# Patient Record
Sex: MALE | Race: WHITE | NOT HISPANIC OR LATINO | Employment: FULL TIME | URBAN - METROPOLITAN AREA
[De-identification: names, ages, dates, MRNs, and addresses within clinical notes are randomized per-mention and may not be internally consistent; named-entity substitution may affect disease eponyms.]

---

## 2018-06-29 ENCOUNTER — HOSPITAL ENCOUNTER (EMERGENCY)
Facility: HOSPITAL | Age: 63
Discharge: ANOTHER HOSPITAL | End: 2018-06-29
Attending: FAMILY MEDICINE | Admitting: FAMILY MEDICINE
Payer: COMMERCIAL

## 2018-06-29 ENCOUNTER — HOSPITAL ENCOUNTER (INPATIENT)
Facility: HOSPITAL | Age: 63
LOS: 1 days | Discharge: HOME | DRG: 247 | End: 2018-06-30
Attending: EMERGENCY MEDICINE | Admitting: INTERNAL MEDICINE
Payer: COMMERCIAL

## 2018-06-29 ENCOUNTER — APPOINTMENT (OUTPATIENT)
Dept: CT IMAGING | Facility: HOSPITAL | Age: 63
End: 2018-06-29
Attending: FAMILY MEDICINE
Payer: COMMERCIAL

## 2018-06-29 VITALS
SYSTOLIC BLOOD PRESSURE: 131 MMHG | WEIGHT: 205.03 LBS | RESPIRATION RATE: 22 BRPM | HEART RATE: 64 BPM | TEMPERATURE: 97.5 F | DIASTOLIC BLOOD PRESSURE: 79 MMHG | OXYGEN SATURATION: 99 %

## 2018-06-29 DIAGNOSIS — I21.3 ST ELEVATION MYOCARDIAL INFARCTION (STEMI), UNSPECIFIED ARTERY (CMS/HCC): Primary | ICD-10-CM

## 2018-06-29 DIAGNOSIS — I25.9 CHEST PAIN DUE TO MYOCARDIAL ISCHEMIA, UNSPECIFIED ISCHEMIC CHEST PAIN TYPE: ICD-10-CM

## 2018-06-29 DIAGNOSIS — I21.3 ST ELEVATION MYOCARDIAL INFARCTION (STEMI), UNSPECIFIED ARTERY (CMS/HCC): ICD-10-CM

## 2018-06-29 DIAGNOSIS — I21.09 ST ELEVATION MYOCARDIAL INFARCTION (STEMI) INVOLVING OTHER CORONARY ARTERY OF ANTERIOR WALL (CMS/HCC): ICD-10-CM

## 2018-06-29 DIAGNOSIS — R07.9 CHEST PAIN: Primary | ICD-10-CM

## 2018-06-29 LAB
ALBUMIN SERPL-MCNC: 3.6 G/DL (ref 3.5–5.3)
ALP SERPL-CCNC: 72 U/L (ref 45–115)
ALT SERPL-CCNC: 14 U/L (ref 0–52)
ANION GAP SERPL CALC-SCNC: 12 MMOL/L (ref 3–11)
APTT PPP: 31 SECONDS (ref 24–37)
AST SERPL-CCNC: 11 U/L (ref 0–39)
BASOPHILS NFR BLD AUTO: 1 % (ref 0–2)
BILIRUB SERPL-MCNC: 0.48 MG/DL (ref 0–1.4)
BNP SERPL-MCNC: 36 PG/ML (ref 0–99)
BUN SERPL-MCNC: 16 MG/DL (ref 7–25)
CALCIUM ALBUM COR SERPL-MCNC: 9.4 MG/DL (ref 8.5–10.1)
CALCIUM SERPL-MCNC: 9.1 MG/DL (ref 8.6–10.3)
CHLORIDE SERPL-SCNC: 105 MMOL/L (ref 98–107)
CHOLEST SERPL-MCNC: 241 MG/DL (ref 0–199)
CO2 SERPL-SCNC: 20 MMOL/L (ref 21–32)
CREAT SERPL-MCNC: 0.9 MG/DL (ref 0.8–1.3)
D DIMER PPP FEU-MCNC: 0.37 UG/ML FEU (ref 0–0.5)
EOSINOPHIL NFR BLD AUTO: 2 % (ref 0–3)
ERYTHROCYTE [DISTWIDTH] IN BLOOD BY AUTOMATED COUNT: 14.2 % (ref 11.5–15)
GFR SERPL CREATININE-BSD FRML MDRD: 85 ML/MIN/1.73M*2
GLUCOSE SERPL-MCNC: 141 MG/DL (ref 70–105)
HCT VFR BLD AUTO: 44.3 % (ref 38–50)
HDLC SERPL-MCNC: 37 MG/DL
HGB BLD-MCNC: 15.5 G/DL (ref 13.2–17.2)
INR PPP: 0.98
LDLC SERPL CALC-MCNC: 169 MG/DL (ref 0–99)
LYMPHOCYTES NFR BLD AUTO: 37 % (ref 15–47)
MAGNESIUM SERPL-MCNC: 1.9 MG/DL (ref 1.8–2.4)
MCH RBC QN AUTO: 32.3 PG (ref 29–34)
MCHC RBC AUTO-ENTMCNC: 35 G/DL (ref 32–36)
MCV RBC AUTO: 92 FL (ref 82–97)
MONOCYTES NFR BLD AUTO: 8 % (ref 5–13)
NEUTROPHILS NFR BLD AUTO: 52 % (ref 46–70)
PLATELET # BLD AUTO: 282 10*3/UL (ref 130–350)
PMV BLD AUTO: 6.8 FL (ref 6.9–10.8)
POTASSIUM SERPL-SCNC: 3.6 MMOL/L (ref 3.5–5.1)
PROT SERPL-MCNC: 5.9 G/DL (ref 6–8.3)
PROTHROMBIN TIME: 13.1 SECONDS (ref 12.3–14.8)
RBC # BLD AUTO: 4.81 10*6/ΜL (ref 4.1–5.8)
SODIUM SERPL-SCNC: 137 MMOL/L (ref 135–145)
TRIGL SERPL-MCNC: 177 MG/DL
TROPONIN I SERPL-MCNC: 4.96 NG/ML
TROPONIN I SERPL-MCNC: <0.03 NG/ML
WBC # BLD AUTO: 5.5 10*3/UL (ref 3.7–9.6)

## 2018-06-29 PROCEDURE — C1874 STENT, COATED/COV W/DEL SYS: HCPCS | Performed by: INTERNAL MEDICINE

## 2018-06-29 PROCEDURE — 6360000200 HC RX 636 W HCPCS (ALT 250 FOR IP): Performed by: FAMILY MEDICINE

## 2018-06-29 PROCEDURE — 96365 THER/PROPH/DIAG IV INF INIT: CPT

## 2018-06-29 PROCEDURE — C1887 CATHETER, GUIDING: HCPCS | Performed by: INTERNAL MEDICINE

## 2018-06-29 PROCEDURE — 92941 PRQ TRLML REVSC TOT OCCL AMI: CPT | Mod: LD | Performed by: INTERNAL MEDICINE

## 2018-06-29 PROCEDURE — 93010 ELECTROCARDIOGRAM REPORT: CPT | Mod: NC | Performed by: FAMILY MEDICINE

## 2018-06-29 PROCEDURE — C1725 CATH, TRANSLUMIN NON-LASER: HCPCS | Performed by: INTERNAL MEDICINE

## 2018-06-29 PROCEDURE — 93005 ELECTROCARDIOGRAM TRACING: CPT | Performed by: EMERGENCY MEDICINE

## 2018-06-29 PROCEDURE — 6360000200 HC RX 636 W HCPCS (ALT 250 FOR IP): Performed by: INTERNAL MEDICINE

## 2018-06-29 PROCEDURE — 80061 LIPID PANEL: CPT | Performed by: FAMILY MEDICINE

## 2018-06-29 PROCEDURE — 2580000300 HC RX 258

## 2018-06-29 PROCEDURE — 36415 COLL VENOUS BLD VENIPUNCTURE: CPT | Performed by: EMERGENCY MEDICINE

## 2018-06-29 PROCEDURE — 99152 MOD SED SAME PHYS/QHP 5/>YRS: CPT | Performed by: INTERNAL MEDICINE

## 2018-06-29 PROCEDURE — 99205 OFFICE O/P NEW HI 60 MIN: CPT | Mod: 25 | Performed by: INTERNAL MEDICINE

## 2018-06-29 PROCEDURE — 2550000100 HC RX 255: Mod: JW | Performed by: INTERNAL MEDICINE

## 2018-06-29 PROCEDURE — 2580000300 HC RX 258: Performed by: INTERNAL MEDICINE

## 2018-06-29 PROCEDURE — (BLANK) HC CATH LAB LEVEL 2 EACH ADDITIONAL MIN: Performed by: INTERNAL MEDICINE

## 2018-06-29 PROCEDURE — C1769 GUIDE WIRE: HCPCS | Performed by: INTERNAL MEDICINE

## 2018-06-29 PROCEDURE — 85730 THROMBOPLASTIN TIME PARTIAL: CPT | Performed by: FAMILY MEDICINE

## 2018-06-29 PROCEDURE — 2550000100 HC RX 255: Performed by: FAMILY MEDICINE

## 2018-06-29 PROCEDURE — 6360000200 HC RX 636 W HCPCS (ALT 250 FOR IP)

## 2018-06-29 PROCEDURE — 92928 PRQ TCAT PLMT NTRAC ST 1 LES: CPT | Mod: RC | Performed by: INTERNAL MEDICINE

## 2018-06-29 PROCEDURE — C1894 INTRO/SHEATH, NON-LASER: HCPCS | Performed by: INTERNAL MEDICINE

## 2018-06-29 PROCEDURE — 80053 COMPREHEN METABOLIC PANEL: CPT | Performed by: FAMILY MEDICINE

## 2018-06-29 PROCEDURE — 85025 COMPLETE CBC W/AUTO DIFF WBC: CPT | Performed by: FAMILY MEDICINE

## 2018-06-29 PROCEDURE — 71275 CT ANGIOGRAPHY CHEST: CPT

## 2018-06-29 PROCEDURE — 99284 EMERGENCY DEPT VISIT MOD MDM: CPT | Performed by: EMERGENCY MEDICINE

## 2018-06-29 PROCEDURE — 96372 THER/PROPH/DIAG INJ SC/IM: CPT

## 2018-06-29 PROCEDURE — 85379 FIBRIN DEGRADATION QUANT: CPT | Performed by: FAMILY MEDICINE

## 2018-06-29 PROCEDURE — 6370000100 HC RX 637 (ALT 250 FOR IP): Performed by: FAMILY MEDICINE

## 2018-06-29 PROCEDURE — 6370000100 HC RX 637 (ALT 250 FOR IP)

## 2018-06-29 PROCEDURE — 96366 THER/PROPH/DIAG IV INF ADDON: CPT

## 2018-06-29 PROCEDURE — 99999 PR OFFICE/OUTPT VISIT,PROCEDURE ONLY: CPT | Mod: NCNR | Performed by: FAMILY MEDICINE

## 2018-06-29 PROCEDURE — 36415 COLL VENOUS BLD VENIPUNCTURE: CPT | Performed by: FAMILY MEDICINE

## 2018-06-29 PROCEDURE — 99291 CRITICAL CARE FIRST HOUR: CPT | Performed by: FAMILY MEDICINE

## 2018-06-29 PROCEDURE — 027135Z DILATION OF CORONARY ARTERY, TWO ARTERIES WITH TWO DRUG-ELUTING INTRALUMINAL DEVICES, PERCUTANEOUS APPROACH: ICD-10-PCS | Performed by: INTERNAL MEDICINE

## 2018-06-29 PROCEDURE — 2500000200 HC RX 250 WO HCPCS: Performed by: INTERNAL MEDICINE

## 2018-06-29 PROCEDURE — 84484 ASSAY OF TROPONIN QUANT: CPT | Performed by: FAMILY MEDICINE

## 2018-06-29 PROCEDURE — 99284 EMERGENCY DEPT VISIT MOD MDM: CPT

## 2018-06-29 PROCEDURE — 93454 CORONARY ARTERY ANGIO S&I: CPT | Mod: 26,59 | Performed by: INTERNAL MEDICINE

## 2018-06-29 PROCEDURE — C1751 CATH, INF, PER/CENT/MIDLINE: HCPCS | Performed by: INTERNAL MEDICINE

## 2018-06-29 PROCEDURE — 85610 PROTHROMBIN TIME: CPT | Performed by: FAMILY MEDICINE

## 2018-06-29 PROCEDURE — 99285 EMERGENCY DEPT VISIT HI MDM: CPT | Performed by: FAMILY MEDICINE

## 2018-06-29 PROCEDURE — 83735 ASSAY OF MAGNESIUM: CPT | Performed by: FAMILY MEDICINE

## 2018-06-29 PROCEDURE — (BLANK) HC CATH LAB LEVEL 2 FIRST 15 MIN: Performed by: INTERNAL MEDICINE

## 2018-06-29 PROCEDURE — 93005 ELECTROCARDIOGRAM TRACING: CPT | Performed by: FAMILY MEDICINE

## 2018-06-29 PROCEDURE — 83880 ASSAY OF NATRIURETIC PEPTIDE: CPT | Performed by: FAMILY MEDICINE

## 2018-06-29 PROCEDURE — 2580000300 HC RX 258: Performed by: FAMILY MEDICINE

## 2018-06-29 PROCEDURE — 96375 TX/PRO/DX INJ NEW DRUG ADDON: CPT

## 2018-06-29 PROCEDURE — 84484 ASSAY OF TROPONIN QUANT: CPT | Performed by: EMERGENCY MEDICINE

## 2018-06-29 DEVICE — STENT RONYX27522UX RESOLUTE ONYX 2.75X22
Type: IMPLANTABLE DEVICE | Status: FUNCTIONAL
Brand: RESOLUTE ONYX™

## 2018-06-29 DEVICE — STENT RONYX25018UX RESOLUTE ONYX 2.50X18
Type: IMPLANTABLE DEVICE | Status: FUNCTIONAL
Brand: RESOLUTE ONYX™

## 2018-06-29 RX ORDER — ENOXAPARIN SODIUM 100 MG/ML
INJECTION SUBCUTANEOUS
Status: DISCONTINUED
Start: 2018-06-29 | End: 2018-06-29 | Stop reason: HOSPADM

## 2018-06-29 RX ORDER — FENTANYL CITRATE/PF 50 MCG/ML
25 PLASTIC BAG, INJECTION (ML) INTRAVENOUS ONCE
Status: COMPLETED | OUTPATIENT
Start: 2018-06-29 | End: 2018-06-29

## 2018-06-29 RX ORDER — FENTANYL CITRATE/PF 50 MCG/ML
PLASTIC BAG, INJECTION (ML) INTRAVENOUS AS NEEDED
Status: DISCONTINUED | OUTPATIENT
Start: 2018-06-29 | End: 2018-06-29 | Stop reason: HOSPADM

## 2018-06-29 RX ORDER — NITROGLYCERIN 20 MG/100ML
5-100 INJECTION INTRAVENOUS
Status: DISCONTINUED | OUTPATIENT
Start: 2018-06-29 | End: 2018-06-30

## 2018-06-29 RX ORDER — ENOXAPARIN SODIUM 100 MG/ML
30 INJECTION SUBCUTANEOUS ONCE
Status: DISCONTINUED | OUTPATIENT
Start: 2018-06-29 | End: 2018-06-29 | Stop reason: HOSPADM

## 2018-06-29 RX ORDER — NITROGLYCERIN 20 MG/100ML
INJECTION INTRAVENOUS
Status: DISCONTINUED
Start: 2018-06-29 | End: 2018-06-29 | Stop reason: HOSPADM

## 2018-06-29 RX ORDER — IOPAMIDOL 755 MG/ML
100 INJECTION, SOLUTION INTRAVASCULAR ONCE
Status: COMPLETED | OUTPATIENT
Start: 2018-06-29 | End: 2018-06-29

## 2018-06-29 RX ORDER — IOPAMIDOL 755 MG/ML
INJECTION, SOLUTION INTRAVASCULAR AS NEEDED
Status: DISCONTINUED | OUTPATIENT
Start: 2018-06-29 | End: 2018-06-29 | Stop reason: HOSPADM

## 2018-06-29 RX ORDER — ENOXAPARIN SODIUM 100 MG/ML
1 INJECTION SUBCUTANEOUS ONCE
Status: DISCONTINUED | OUTPATIENT
Start: 2018-06-29 | End: 2018-06-29 | Stop reason: HOSPADM

## 2018-06-29 RX ORDER — CLOPIDOGREL BISULFATE 300 MG/1
TABLET, FILM COATED ORAL
Status: DISCONTINUED
Start: 2018-06-29 | End: 2018-06-29 | Stop reason: HOSPADM

## 2018-06-29 RX ORDER — ONDANSETRON HYDROCHLORIDE 2 MG/ML
4 INJECTION, SOLUTION INTRAVENOUS ONCE
Status: COMPLETED | OUTPATIENT
Start: 2018-06-29 | End: 2018-06-29

## 2018-06-29 RX ORDER — NITROGLYCERIN 0.4 MG/1
0.4 TABLET SUBLINGUAL EVERY 5 MIN PRN
Status: DISCONTINUED | OUTPATIENT
Start: 2018-06-29 | End: 2018-06-29 | Stop reason: HOSPADM

## 2018-06-29 RX ORDER — MIDAZOLAM HYDROCHLORIDE 1 MG/ML
INJECTION, SOLUTION INTRAMUSCULAR; INTRAVENOUS AS NEEDED
Status: DISCONTINUED | OUTPATIENT
Start: 2018-06-29 | End: 2018-06-29 | Stop reason: HOSPADM

## 2018-06-29 RX ORDER — LIDOCAINE HYDROCHLORIDE 10 MG/ML
INJECTION, SOLUTION EPIDURAL; INFILTRATION; INTRACAUDAL; PERINEURAL AS NEEDED
Status: DISCONTINUED | OUTPATIENT
Start: 2018-06-29 | End: 2018-06-29 | Stop reason: HOSPADM

## 2018-06-29 RX ORDER — NITROGLYCERIN 0.4 MG/1
TABLET SUBLINGUAL
Status: DISCONTINUED
Start: 2018-06-29 | End: 2018-06-29 | Stop reason: HOSPADM

## 2018-06-29 RX ORDER — CLOPIDOGREL BISULFATE 300 MG/1
600 TABLET, FILM COATED ORAL ONCE
Status: DISCONTINUED | OUTPATIENT
Start: 2018-06-29 | End: 2018-06-29 | Stop reason: HOSPADM

## 2018-06-29 RX ORDER — METOPROLOL TARTRATE 1 MG/ML
INJECTION, SOLUTION INTRAVENOUS
Status: DISCONTINUED
Start: 2018-06-29 | End: 2018-06-29 | Stop reason: WASHOUT

## 2018-06-29 RX ORDER — FENTANYL CITRATE/PF 50 MCG/ML
PLASTIC BAG, INJECTION (ML) INTRAVENOUS
Status: COMPLETED
Start: 2018-06-29 | End: 2018-06-29

## 2018-06-29 RX ORDER — SODIUM CHLORIDE 9 MG/ML
1000 INJECTION, SOLUTION INTRAVENOUS ONCE
Status: DISCONTINUED | OUTPATIENT
Start: 2018-06-29 | End: 2018-06-29 | Stop reason: HOSPADM

## 2018-06-29 RX ORDER — NAPROXEN SODIUM 220 MG/1
324 TABLET, FILM COATED ORAL ONCE
Status: DISCONTINUED | OUTPATIENT
Start: 2018-06-29 | End: 2018-06-29 | Stop reason: HOSPADM

## 2018-06-29 RX ORDER — ONDANSETRON HYDROCHLORIDE 2 MG/ML
4 INJECTION, SOLUTION INTRAVENOUS ONCE
Status: DISCONTINUED | OUTPATIENT
Start: 2018-06-29 | End: 2018-06-29 | Stop reason: HOSPADM

## 2018-06-29 RX ORDER — HEPARIN SODIUM 1000 [USP'U]/ML
INJECTION, SOLUTION INTRAVENOUS; SUBCUTANEOUS
Status: DISCONTINUED
Start: 2018-06-29 | End: 2018-06-29 | Stop reason: WASHOUT

## 2018-06-29 RX ADMIN — ENOXAPARIN SODIUM 90 MG: 100 INJECTION SUBCUTANEOUS at 13:20

## 2018-06-29 RX ADMIN — SODIUM CHLORIDE 1000 ML: 9 INJECTION, SOLUTION INTRAVENOUS at 11:59

## 2018-06-29 RX ADMIN — ENOXAPARIN SODIUM 30 MG: 30 INJECTION SUBCUTANEOUS at 13:20

## 2018-06-29 RX ADMIN — NITROGLYCERIN 0.4 MG: 0.4 TABLET SUBLINGUAL at 11:46

## 2018-06-29 RX ADMIN — FENTANYL CITRATE 25 MCG: 50 INJECTION, SOLUTION INTRAMUSCULAR; INTRAVENOUS at 12:05

## 2018-06-29 RX ADMIN — ENOXAPARIN SODIUM 30 MG: 100 INJECTION SUBCUTANEOUS at 13:20

## 2018-06-29 RX ADMIN — ONDANSETRON 4 MG: 2 INJECTION INTRAMUSCULAR; INTRAVENOUS at 11:54

## 2018-06-29 RX ADMIN — NITROGLYCERIN 0.4 MG: 0.4 TABLET SUBLINGUAL at 13:01

## 2018-06-29 RX ADMIN — CLOPIDOGREL BISULFATE 600 MG: 300 TABLET, FILM COATED ORAL at 13:35

## 2018-06-29 RX ADMIN — NITROGLYCERIN 0.4 MG: 0.4 TABLET SUBLINGUAL at 11:51

## 2018-06-29 RX ADMIN — TENECTEPLASE 50 MG: KIT at 13:31

## 2018-06-29 RX ADMIN — Medication 25 MCG: at 12:05

## 2018-06-29 RX ADMIN — IOPAMIDOL 100 ML: 755 INJECTION, SOLUTION INTRAVENOUS at 13:20

## 2018-06-29 RX ADMIN — NITROGLYCERIN 5 MCG/MIN: 20 INJECTION INTRAVENOUS at 13:27

## 2018-06-29 ASSESSMENT — ENCOUNTER SYMPTOMS
HEMOPTYSIS: 0
ABDOMINAL PAIN: 0
COUGH: 0
MEMORY LOSS: 0
NERVOUS/ANXIOUS: 0
SHORTNESS OF BREATH: 1
SEIZURES: 0
HEMATOCHEZIA: 0
BACK PAIN: 0
DECREASED APPETITE: 0
CHILLS: 0
CLAUDICATION: 0
FEVER: 0
LOSS OF BALANCE: 0
SHORTNESS OF BREATH: 0
SUSPICIOUS LESIONS: 0
PARESTHESIAS: 0
NUMBNESS: 0
HEADACHES: 0
CHANGE IN BOWEL HABIT: 0
HEMATEMESIS: 0
DYSPNEA ON EXERTION: 1
JOINT SWELLING: 0
DIARRHEA: 0
DEPRESSION: 0
IRREGULAR HEARTBEAT: 0
SYNCOPE: 0
SENSORY CHANGE: 0
CONSTIPATION: 0

## 2018-06-29 ASSESSMENT — PAIN DESCRIPTION - DESCRIPTORS
DESCRIPTORS: PRESSURE;STABBING
DESCRIPTORS: PRESSURE

## 2018-06-29 ASSESSMENT — ACTIVITIES OF DAILY LIVING (ADL)
ADEQUATE_TO_COMPLETE_ADL: YES
ASSISTIVE_DEVICES: EYEGLASSES

## 2018-06-29 NOTE — Clinical Note
Stent deployed in the left anterior descending diagonal. Pressure = 12 theodore. Inflation time =  20 seconds.

## 2018-06-29 NOTE — ED PROVIDER NOTES
HPI:  Chief Complaint   Patient presents with   • Chest Pain     PT HERE VIA [facility] WITH CHEST PAIN. PT RECEIVED TNK, PAIN FREE NOW.       DFF-81-xtnp-old male transfer from [facility] for evaluation of chest pain.  He was seen at [facility] facility began having severe discomfort in his groin and left axilla which radiated into his left arm and then gradually began having chest pain and shortness of breath.  No preceding dyspnea on exertion or chest pain on exertion.  Cardiology had been called recommend T and K and transfer here to [facility] at this time he is currently pain-free.  Patient is very good health he does smoke he has had no medications or medical issues in his past.    HISTORY:  History reviewed. No pertinent past medical history.    History reviewed. No pertinent surgical history.    History reviewed. No pertinent family history.  Positive cardiac problems in the past  Social History   Substance Use Topics   • Smoking status: Current Every Day Smoker     Packs/day: 1.00   • Smokeless tobacco: Never Used   • Alcohol use Yes      Comment: occasional       ROS:  Review of Systems   Respiratory: Positive for shortness of breath.    Cardiovascular: Positive for chest pain.   All other systems reviewed and are negative.      PE:  ED Triage Vitals [06/29/18 1447]   Temp Heart Rate Resp BP SpO2   36.8 °C (98.2 °F) 78 18 121/75 96 %      Temp Source Heart Rate Source Patient Position BP Location FiO2 (%)   Oral -- -- -- --       Physical Exam   Constitutional: He appears well-developed and well-nourished.   HENT:   Head: Normocephalic and atraumatic.   Eyes: Conjunctivae are normal.   Neck: Neck supple.   Cardiovascular: Normal rate and regular rhythm.    No murmur heard.  Pulmonary/Chest: Effort normal and breath sounds normal. No respiratory distress.   Abdominal: Soft. There is no tenderness.   Musculoskeletal: He exhibits no edema.   Neurological: He is alert.   Skin: Skin is warm and dry.    Psychiatric: He has a normal mood and affect.   Nursing note and vitals reviewed.      ED LABS:  Labs Reviewed - No data to display    Repeat troponin level is pending at time patient goes to cardiac cath lab.   ED IMAGES:  Coronary Angiography    (Results Pending)     Outside CTA chest without acute noted.   ED PROCEDURES:  ECG 12 lead, Chest Pain  Date/Time: 6/29/2018 3:00 PM  Performed by: MONISHA NOBLE  Authorized by: MONISHA NOBLE     ECG reviewed by ED Physician in the absence of a cardiologist: yes    Previous ECG:     Previous ECG:  Compared to current  Interpretation:     Interpretation: abnormal    Comments:      Heart rate 60 normal sinus rhythm there is a suggestion of some ST segment elevation V4 5 6 preceding x-ray was significantly higher.        ED COURSE:       MDM:  MDM-patient arrives here pain-free with a troponin that is negative at initial evaluation at outside facility.  We will currently maintain him on his nitroglycerin drip.  Will discuss with cardiology.  Cardiology evaluates patient and he is taken emergently to cardiac catheter lab.     Final diagnoses:   [R07.9] Chest pain   I, Dr. Noble, personally performed the services described in this documentation as typed by the scribe while in my presence and is both accurate and complete.     A voice recognition program was used to aid in documentation of this record.  Sometimes words are not printed exactly as they were spoken.  While efforts were made to carefully edit and correct any inaccuracies, some areas may be present; please take these into context.  Please contact the provider if areas are identified.      Monisha Noble MD  06/29/18 8287

## 2018-06-29 NOTE — MEDICATION HISTORY SPECIALIST NOTES
CSN: [id number]  : [id number]  Patient/family reports no home medications. Allergies verified. Denies OTC/Prescription medications.

## 2018-06-29 NOTE — POST-PROCEDURE NOTE
Post Procedure Note:    Winston Galarza  6/29/2018    Pre-op Diagnosis     * ST elevation myocardial infarction (STEMI), unspecified artery (CMS/HCC) [I21.3]       Post-Op Diagnosis Codes:     * ST elevation myocardial infarction (STEMI), unspecified artery (CMS/HCC) [I21.3]    Coronary Angiography  Coronary artery/graft stenting    Anesthesia:  Conscious sedation was administered by trained RN.  I supervised the use of sedation medication during this 1 hour procedure.  Patient did receive fentanyl prior to arrival  at  [facility].  Patient had no complications during this procedure.  There is no hypoxemia, hypotension or oversedation.  There was an independent trained observer present throughout this procedure.    Procedure narrative:  Patient arrived was transferred to [place] following administration of TNKase for acute myocardial infarction.  He was pain-free on arrival.    Patient was taken to the cardiac catheterization lab.  Radial access was obtained without difficulty.  Guidewire was advanced into the subclavian artery and then into a sending aorta.  Over this a JL 3.5 diagnostic catheter was advanced into the descending aorta and then engaged with the left main.  Coronary angiography was performed left persistent.  There is diffuse mild coronary was a critical stenosis in the proximal third of the LAD.  There was a subtotally occluded first diagonal branch was felt to most likely be the culprit vessel.    The JL 3.5 diagnostic catheter was then switched out for a JR 3.5 diagnostic catheter and angiography of the RCA was performed.  This showed subtotal occlusion of the RCA with SUKHI grade II flow distally.  It was elected to proceed the RCA first AR-2 catheter was attempted without good success in cannulating the RCA.  An Akari Right guiding catheter was utilized with success.  A forte wire was advanced past the areas of stenosis.  The vessel was predilated with a 2.5 x 12 mm sprinter balloon and and that the 2  areas of stenosis were covered with a single resolute edmond 2.75 x 22 mm drug-eluting stent.  Post ablation is a very good angiographic result.    Attention was then turned to the LAD diagonal branch.  CLS guiding catheter was utilized along with the same forte wire.  Elected to directly stent this using Nasonex 2.5 x 18 mm drug-eluting stent.  Postvoid there is no residual stenosis.  The stent appeared to be  slightly oversized for the actual size of the artery.  There is SUKHI grade III flow distally.    The CLS guide and forte wire were removed.  The introducer sheath was removed and a TR band applied for hemostasis.  There are no comp occasions.  Patient returned to 6 floor.  Endograft results:  Left main:  The left main coronary is a large artery critical stenosis.    LAD:  Left anterior descending artery is a large caliber vessel proximally.  It does taper slightly and again widens before septal .  The first diagonal branch of the LAD is subtotally occluded with 2 stenoses.  This is felt to most likely be the culprit vessel.    Circumflex:  Circumflex coronary artery is a large-caliber vessel with diffuse mild atherosclerosis.  There is no focal critical disease identified.    RCA:  Right coronary right coronary artery is a focal 90% stenosis just after the first bend in the ICU.  Shortly beyond that there is a 80% narrowing.    Coronary stenting:  RCA was stented first with a single Edmond 2 0.75 x 22 mm stent.  Vessel was predilated with balloon angioplasty and then stented.  A good angiographic result was obtained.  There is SUKHI grade III flow distally.    Attention was then turned to the diagonal of the LAD.  The stenotic area of this pedicle was directly stented using a Edmond resolute 2.5 x 18 mm stent.  There is SUKHI grade III flow distally.    It is unclear following thrombolytic therapy whether or not the residual stenoses or the area of most concern.            Staff:   Circulator: Bobby RICHARDSON  Gregory, RN  Documenter: Leonora Gordillo RN; Kyung Magaña RN  Invasive Staff: Sarah Cisneros    Estimated Blood Loss:  No blood loss documented.    Implants:    Implant Name Type Inv. Item Serial No.  Lot No. LRB No. Used   STENT RESOLUTE GABINO 2.75X22 UX ZOTAROLIMUS ELUTING - [id number] Stent STENT RESOLUTE GABINO 2.75X22 UX ZOTAROLIMUS ELUTING  [employer] [id number] N/A 1   STENT RESOLUTE GABINO 2.50X18 UX ZOTAROLIMUS ELUTING - [id number] Stent STENT RESOLUTE GABINO 2.50X18 UX ZOTAROLIMUS ELUTING   [employer] [id number] N/A 1         Medications:  Medications   nitroglycerin (TRIDIL) 50 mg in D5W 250 mL infusion (premix) ( intravenous MAR Hold 6/29/18 1536)   fentaNYL citrate (PF) 50 mcg/mL injection (50 mcg intravenous Given 6/29/18 1543)   midazolam (VERSED) injection (1 mg intravenous Given 6/29/18 1543)   lidocaine PF (XYLOCAINE) 10 mg/mL (1 %) injection (5 mL infiltration Given 6/29/18 1556)   verapamil (ISOPTIN) 2 mg, nitroglycerin (TRIDIL) 100 mcg, heparin 3,000 Units 1.42 mL injection (radial artery dilation) ( injection Given 6/29/18 1556)   bivalirudin (ANGIOMAX) bolus from infusion (69.75 mg intravenous Given 6/29/18 1610)   bivalirudin (ANGIOMAX) 250 mg in NS 50 mL infusion (1.75 mg/kg/hr × 93 kg intravenous New Bag/New Syringe 6/29/18 1610)   nitroglycerin (TRIDIL) 200 mcg/mL injection (OR/CATH LAB) (600 mcg Intracoronary Given 6/29/18 1631)   iopamidol (ISOVUE-370) 76 % injection (185 mL Other Given 6/29/18 1648)       Complications:  none    Date: 6/29/2018  Time: 4:56 PM

## 2018-06-29 NOTE — Clinical Note
Verified procedural consent signed and present. Verified blood consent signed and present. Verified complete H&P in chart. Verified pre-sedation documentation signed and present.

## 2018-06-29 NOTE — Clinical Note
Prepped: groin and right radial. The site was clipped. Prepped with: ChloraPrep and Betadine. The patient was draped  in the usual sterile fashion.

## 2018-06-29 NOTE — Clinical Note
Sheath(s) removed from the right radial artery. Closure device used: Radial Band. O2 saturation is 98%. Total cc's of air in band = 10.

## 2018-06-29 NOTE — H&P
Subjective    Patient ID: Winston Galarza is a 62 y.o. male.    Chief Complaint:   Chief Complaint   Patient presents with   • Chest Pain     PT HERE VIA [facility] WITH CHEST PAIN. PT RECEIVED TNK, PAIN FREE NOW.     HPI     The patient is a 62-year-old male with no previous cardiac history.  He is staying with a friend in [place].  This morning, he developed sudden onset of left groin pain that radiated up into his left chest and down his left arm.  The pain became fairly severe and was accompanied by significant shortness of breath and nausea.  The patient went into the emergency department in [place].  Initial 12-lead ECG demonstrates J-point elevation in the anterolateral leads without obvious ischemic morphology.  He was started on nitroglycerin infusion and had near complete resolution of his symptoms.  His ECG did improve to some degree as well.  However after about an hour, his pain began to come back.  His follow-up ECG demonstrates a re-elevation of the lateral leads with less obvious involvement of the anterior leads.  After reviewing the follow-up ECG, the patient was administered T and can transferred here.  About 30 minutes after the team K was administered he had complete resolution of his pain.  Upon arrival here he still demonstrates minor ST elevation in the lateral leads and pain is basically resolved.  1/10.  CT angiogram of the chest abdomen and pelvis and Omero demonstrates no evidence of dissection or pulmonary embolism.    Specialty Problems     None        History reviewed. No pertinent past medical history.  History reviewed. No pertinent surgical history.  Allergies as of 06/29/2018   • (No Known Allergies)     No current outpatient prescriptions on file.  History reviewed. No pertinent family history.  Social History   Substance Use Topics   • Smoking status: Current Every Day Smoker     Packs/day: 1.00   • Smokeless tobacco: Never Used   • Alcohol use Yes      Comment: occasional     Review of  Systems  Review of Systems   Constitution: Negative for chills, decreased appetite, fever and malaise/fatigue.   HENT: Negative for congestion.    Eyes: Negative for visual disturbance.   Cardiovascular: Positive for chest pain and dyspnea on exertion. Negative for claudication, irregular heartbeat, leg swelling and syncope.   Respiratory: Negative for cough, hemoptysis and shortness of breath.    Hematologic/Lymphatic: Negative for bleeding problem.   Skin: Negative for rash and suspicious lesions.   Musculoskeletal: Negative for arthritis, back pain, joint pain, joint swelling and muscle weakness.   Gastrointestinal: Negative for abdominal pain, change in bowel habit, constipation, diarrhea, hematemesis and hematochezia.   Neurological: Negative for headaches, loss of balance, numbness, paresthesias, seizures and sensory change.   Psychiatric/Behavioral: Negative for depression and memory loss. The patient is not nervous/anxious.        Objective   Physical Exam   Constitutional: He appears well-developed and well-nourished.   Neck: No JVD present. Carotid bruit is not present.   Cardiovascular: Regular rhythm and normal pulses.  Exam reveals gallop and S4.    Pulses:       Radial pulses are 2+ on the right side, and 2+ on the left side.        Dorsalis pedis pulses are 2+ on the right side, and 2+ on the left side.   Pulmonary/Chest: Effort normal and breath sounds normal. No tachypnea.   Abdominal: Soft. He exhibits no abdominal bruit and no mass. There is no hepatosplenomegaly. There is no tenderness.   Musculoskeletal: Normal range of motion.   Neurological: He is alert. He has normal strength.   Nursing note and vitals reviewed.      Data Review:     Assessment/Plan   Problem List Items Addressed This Visit     None      Visit Diagnoses     ST elevation myocardial infarction (STEMI), unspecified artery (CMS/HCC)        Relevant Orders    Coronary Angiography        Plan:    1.  We will proceed directly to the  cardiac catheterization laboratory for coronary angiography and possible PCI.  Risks and benefits of the procedure were discussed at length with the patient and he wishes to proceed.  Dr. Kemp will be performing the procedure.  2.  We will need to institute high intensity statin therapy, beta-blocker therapy, and antiplatelet therapy.  We did briefly discuss smoking cessation.  3.  Echocardiogram will be ordered for tomorrow morning.

## 2018-06-30 ENCOUNTER — APPOINTMENT (OUTPATIENT)
Dept: CARDIOLOGY | Facility: HOSPITAL | Age: 63
DRG: 247 | End: 2018-06-30
Attending: INTERNAL MEDICINE
Payer: COMMERCIAL

## 2018-06-30 VITALS
HEIGHT: 73 IN | SYSTOLIC BLOOD PRESSURE: 127 MMHG | HEART RATE: 76 BPM | OXYGEN SATURATION: 96 % | DIASTOLIC BLOOD PRESSURE: 85 MMHG | RESPIRATION RATE: 16 BRPM | TEMPERATURE: 98.1 F | BODY MASS INDEX: 27.17 KG/M2 | WEIGHT: 205 LBS

## 2018-06-30 PROBLEM — I21.3 STEMI (ST ELEVATION MYOCARDIAL INFARCTION) (CMS/HCC): Status: RESOLVED | Noted: 2018-06-30 | Resolved: 2018-06-30

## 2018-06-30 PROBLEM — I25.110 CORONARY ARTERY DISEASE INVOLVING NATIVE CORONARY ARTERY OF NATIVE HEART WITH UNSTABLE ANGINA PECTORIS (CMS/HCC): Status: ACTIVE | Noted: 2018-06-30

## 2018-06-30 PROBLEM — R07.9 CHEST PAIN: Status: ACTIVE | Noted: 2018-06-30

## 2018-06-30 PROBLEM — R07.9 CHEST PAIN: Status: RESOLVED | Noted: 2018-06-30 | Resolved: 2018-06-30

## 2018-06-30 PROBLEM — I21.3 STEMI (ST ELEVATION MYOCARDIAL INFARCTION) (CMS/HCC): Status: ACTIVE | Noted: 2018-06-30

## 2018-06-30 LAB
ALBUMIN SERPL-MCNC: 3.5 G/DL (ref 3.5–5.3)
ALP SERPL-CCNC: 70 U/L (ref 45–115)
ALT SERPL-CCNC: 20 U/L (ref 0–52)
ANION GAP SERPL CALC-SCNC: 9 MMOL/L (ref 3–11)
AST SERPL-CCNC: 49 U/L (ref 0–39)
BILIRUB SERPL-MCNC: 0.55 MG/DL (ref 0–1.4)
BUN SERPL-MCNC: 9 MG/DL (ref 7–25)
CALCIUM ALBUM COR SERPL-MCNC: 2.2 MG/DL (ref 1.8–2.4)
CALCIUM ALBUM COR SERPL-MCNC: 9.1 MG/DL (ref 8.5–10.1)
CALCIUM SERPL-MCNC: 8.7 MG/DL (ref 8.6–10.3)
CHLORIDE SERPL-SCNC: 105 MMOL/L (ref 98–107)
CHOLEST SERPL-MCNC: 216 MG/DL (ref 0–199)
CO2 SERPL-SCNC: 24 MMOL/L (ref 21–32)
CREAT SERPL-MCNC: 0.9 MG/DL (ref 0.8–1.3)
ERYTHROCYTE [DISTWIDTH] IN BLOOD BY AUTOMATED COUNT: 14.2 % (ref 11.5–15)
GFR SERPL CREATININE-BSD FRML MDRD: 85 ML/MIN/1.73M*2
GLUCOSE SERPL-MCNC: 126 MG/DL (ref 70–105)
HCT VFR BLD AUTO: 43.1 % (ref 38–50)
HDLC SERPL-MCNC: 38 MG/DL
HGB BLD-MCNC: 15.1 G/DL (ref 13.2–17.2)
LDLC SERPL CALC-MCNC: 145 MG/DL (ref 0–99)
MAGNESIUM SERPL-MCNC: 2.1 MG/DL (ref 1.8–2.4)
MCH RBC QN AUTO: 32.2 PG (ref 29–34)
MCHC RBC AUTO-ENTMCNC: 35.1 G/DL (ref 32–36)
MCV RBC AUTO: 92 FL (ref 82–97)
PLATELET # BLD AUTO: 257 10*3/UL (ref 130–350)
PMV BLD AUTO: 7.1 FL (ref 6.9–10.8)
POTASSIUM SERPL-SCNC: 4.1 MMOL/L (ref 3.5–5.1)
PROT SERPL-MCNC: 5.7 G/DL (ref 6–8.3)
RBC # BLD AUTO: 4.69 10*6/ΜL (ref 4.1–5.8)
SODIUM SERPL-SCNC: 138 MMOL/L (ref 135–145)
TRIGL SERPL-MCNC: 164 MG/DL
TROPONIN I SERPL-MCNC: 8.35 NG/ML
WBC # BLD AUTO: 6.6 10*3/UL (ref 3.7–9.6)

## 2018-06-30 PROCEDURE — 6370000100 HC RX 637 (ALT 250 FOR IP): Performed by: INTERNAL MEDICINE

## 2018-06-30 PROCEDURE — 93306 TTE W/DOPPLER COMPLETE: CPT | Mod: 26 | Performed by: INTERNAL MEDICINE

## 2018-06-30 PROCEDURE — 80053 COMPREHEN METABOLIC PANEL: CPT | Performed by: INTERNAL MEDICINE

## 2018-06-30 PROCEDURE — 84484 ASSAY OF TROPONIN QUANT: CPT | Performed by: INTERNAL MEDICINE

## 2018-06-30 PROCEDURE — 85027 COMPLETE CBC AUTOMATED: CPT | Performed by: INTERNAL MEDICINE

## 2018-06-30 PROCEDURE — (BLANK) HC ROOM ICU CORONARY

## 2018-06-30 PROCEDURE — 83735 ASSAY OF MAGNESIUM: CPT | Performed by: INTERNAL MEDICINE

## 2018-06-30 PROCEDURE — 99238 HOSP IP/OBS DSCHRG MGMT 30/<: CPT | Performed by: INTERNAL MEDICINE

## 2018-06-30 PROCEDURE — 80061 LIPID PANEL: CPT | Performed by: INTERNAL MEDICINE

## 2018-06-30 PROCEDURE — 93010 ELECTROCARDIOGRAM REPORT: CPT | Mod: NC | Performed by: FAMILY MEDICINE

## 2018-06-30 PROCEDURE — 93306 TTE W/DOPPLER COMPLETE: CPT

## 2018-06-30 PROCEDURE — 93005 ELECTROCARDIOGRAM TRACING: CPT | Performed by: INTERNAL MEDICINE

## 2018-06-30 RX ORDER — METOPROLOL SUCCINATE 25 MG/1
25 TABLET, EXTENDED RELEASE ORAL DAILY
Qty: 30 TABLET | Refills: 1 | Status: SHIPPED | OUTPATIENT
Start: 2018-07-01 | End: 2019-07-01

## 2018-06-30 RX ORDER — ATORVASTATIN CALCIUM 80 MG/1
80 TABLET, FILM COATED ORAL NIGHTLY
Status: DISCONTINUED | OUTPATIENT
Start: 2018-06-30 | End: 2018-06-30 | Stop reason: HOSPADM

## 2018-06-30 RX ORDER — ASPIRIN 81 MG/1
81 TABLET ORAL DAILY
Qty: 90 TABLET | Refills: 3 | Status: SHIPPED | OUTPATIENT
Start: 2018-07-01 | End: 2018-06-30

## 2018-06-30 RX ORDER — CLOPIDOGREL BISULFATE 75 MG/1
75 TABLET ORAL DAILY
Qty: 30 TABLET | Refills: 11 | Status: SHIPPED | OUTPATIENT
Start: 2018-07-01 | End: 2018-06-30

## 2018-06-30 RX ORDER — ASPIRIN 81 MG/1
81 TABLET ORAL DAILY
Qty: 90 TABLET | Refills: 1 | Status: SHIPPED | OUTPATIENT
Start: 2018-07-01 | End: 2019-08-25 | Stop reason: SDUPTHER

## 2018-06-30 RX ORDER — CLOPIDOGREL BISULFATE 75 MG/1
75 TABLET ORAL DAILY
Status: DISCONTINUED | OUTPATIENT
Start: 2018-06-30 | End: 2018-06-30 | Stop reason: HOSPADM

## 2018-06-30 RX ORDER — NITROGLYCERIN 0.4 MG/1
0.4 TABLET SUBLINGUAL EVERY 5 MIN PRN
Qty: 20 TABLET | Refills: 3 | Status: SHIPPED | OUTPATIENT
Start: 2018-06-30 | End: 2018-06-30

## 2018-06-30 RX ORDER — ATORVASTATIN CALCIUM 80 MG/1
80 TABLET, FILM COATED ORAL NIGHTLY
Qty: 30 TABLET | Refills: 11 | Status: SHIPPED | OUTPATIENT
Start: 2018-06-30 | End: 2019-07-25 | Stop reason: SDUPTHER

## 2018-06-30 RX ORDER — NITROGLYCERIN 0.4 MG/1
0.4 TABLET SUBLINGUAL EVERY 5 MIN PRN
Qty: 20 TABLET | Refills: 3 | Status: SHIPPED | OUTPATIENT
Start: 2018-06-30

## 2018-06-30 RX ORDER — ASPIRIN 81 MG/1
81 TABLET ORAL DAILY
Status: DISCONTINUED | OUTPATIENT
Start: 2018-06-30 | End: 2018-06-30 | Stop reason: HOSPADM

## 2018-06-30 RX ORDER — METOPROLOL SUCCINATE 25 MG/1
25 TABLET, EXTENDED RELEASE ORAL DAILY
Status: DISCONTINUED | OUTPATIENT
Start: 2018-06-30 | End: 2018-06-30 | Stop reason: HOSPADM

## 2018-06-30 RX ORDER — CLOPIDOGREL BISULFATE 75 MG/1
75 TABLET ORAL DAILY
Qty: 30 TABLET | Refills: 11 | Status: SHIPPED | OUTPATIENT
Start: 2018-07-01 | End: 2019-07-01

## 2018-06-30 RX ORDER — METOPROLOL SUCCINATE 25 MG/1
25 TABLET, EXTENDED RELEASE ORAL DAILY
Qty: 30 TABLET | Refills: 11 | Status: SHIPPED | OUTPATIENT
Start: 2018-07-01 | End: 2018-06-30

## 2018-06-30 RX ORDER — ACETAMINOPHEN 325 MG/1
650 TABLET ORAL EVERY 4 HOURS PRN
Status: DISCONTINUED | OUTPATIENT
Start: 2018-06-30 | End: 2018-06-30 | Stop reason: HOSPADM

## 2018-06-30 RX ORDER — ATORVASTATIN CALCIUM 80 MG/1
80 TABLET, FILM COATED ORAL NIGHTLY
Qty: 30 TABLET | Refills: 11 | Status: SHIPPED | OUTPATIENT
Start: 2018-06-30 | End: 2018-06-30

## 2018-06-30 RX ADMIN — ASPIRIN 81 MG: 81 TABLET ORAL at 11:41

## 2018-06-30 RX ADMIN — METOPROLOL SUCCINATE 25 MG: 25 TABLET, EXTENDED RELEASE ORAL at 11:41

## 2018-06-30 RX ADMIN — CLOPIDOGREL BISULFATE 75 MG: 75 TABLET ORAL at 11:41

## 2018-06-30 RX ADMIN — ATORVASTATIN CALCIUM 80 MG: 80 TABLET, FILM COATED ORAL at 11:41

## 2018-06-30 ASSESSMENT — ENCOUNTER SYMPTOMS
SORE THROAT: 0
CHILLS: 0
EYES NEGATIVE: 1
PALPITATIONS: 0
COLOR CHANGE: 0
RHINORRHEA: 0
LIGHT-HEADEDNESS: 0
APPETITE CHANGE: 0
NERVOUS/ANXIOUS: 0
FEVER: 0
VOMITING: 0
CHEST TIGHTNESS: 0
NAUSEA: 0
COUGH: 0
CONFUSION: 0
BACK PAIN: 0
SHORTNESS OF BREATH: 0

## 2018-06-30 NOTE — PLAN OF CARE
Problem: Cardiovascular - Adult  Goal: Maintains optimal cardiac output and hemodynamic stability  INTERVENTIONS:  1. Monitor vital signs and rhythm  2. Monitor for hypotension and other signs of decreased cardiac output  3. Administer and titrate ordered vasoactive medications to optimize hemodynamic stability  4. Monitor for fluid overload/dehydration, weight gain, shortness of breath and activity intolerance  5. Monitor arterial and/or venous puncture sites for bleeding and/or hematoma  6. Assess quality of pulses, capillary refill, edema, sensation, skin color and temperature  7. Assess for signs of decreased coronary artery perfusion - ex. angina   Outcome: Progressing   06/30/18 0419   Interventions Appropriate for this Patient   Maintain optimal cardiac output and hemodynamic stability Monitor vital signs and rhythm;Monitor for hypotension and other signs of decreased cardiac output;Monitor for fluid overload/dehydration, weight gain, shortness of breath and activity intolerance;Monitor arterial and/or venous puncture sites for bleeding and/or hematoma;Assess quality of pulses, capillary refill, edema, sensation, skin color and temperature;Assess for signs of decreased coronary artery perfusion - ex. angina     Goal: Absence of cardiac dysrhythmias or at baseline  INTERVENTIONS:  1. Continuous cardiac monitoring, monitor vital signs, obtain 12 lead EKG if indicated  2. Administer antiarrhythmic and heart rate control medications as ordered  3. Initiate emergency measures for life threatening arrhythmias  4. Monitor electrolytes and administer replacement therapy as ordered   Outcome: Progressing   06/30/18 0419   Interventions Appropriate for this Patient   Absence of cardiac dysrhythmias or at baseline Continuous cardiac monitoring, monitor vital signs, obtain 12 lead EKG if indicated;Monitor electrolytes and administer replacement therapy as ordered       Problem: Metabolic/Fluid and Electrolytes -  Adult  Goal: Electrolytes maintained within normal limits  INTERVENTIONS:  1. Monitor labs and assess patient for signs and symptoms of electrolyte imbalances  2. Administer electrolyte replacement as ordered  3. Monitor response to electrolyte replacements, including repeat lab results as appropriate  4. Fluid restriction as ordered  5. Instruct patient on fluid and nutrition restrictions as appropriate   Outcome: Progressing   06/30/18 0419   Interventions Appropriate for this Patient   Electrolytes maintained within normal limits Monitor labs and assess patient for signs and symptoms of electrolyte imbalances;Instruct patient on fluid and nutrition restrictions as appropriate     Goal: Maintain Optimal Renal Function and Hemodynamic Stability  INTERVENTIONS:  1. Monitor labs and assess for signs and symptoms of volume excess or deficit  2. Monitor intake, output and patient weight  3. Monitor urine specific gravity, serum osmolarity and serum sodium as indicated or ordered  4. Monitor response to interventions for patient's volume status, including labs, urine output, blood pressure (other measures as available)  5. Encourage oral intake as appropriate  6. Instruct patient on fluid and nutrition restrictions as appropriate   Outcome: Progressing   06/30/18 0419   Interventions Appropriate for this Patient   Maintain optimal renal function and hemodynaimc stability Monitor labs and assess for signs and symptoms of volume excess or deficit;Monitor intake, output and patient weight;Monitor response to interventions for patient's volume status, including labs, urine output, blood pressure (other measures as available);Encourage oral intake as appropriate       Problem: Skin/Tissue Integrity - Adult  Goal: Skin integrity remains intact  INTERVENTIONS  1. Assess and document risk factors for pressure ulcer development  2. Assess and document skin integrity  3. Monitor for areas of redness and/or skin breakdown  4.  Initiate pressure ulcer prevention measures as indicated   Outcome: Progressing   06/30/18 0419   Interventions Appropriate for this Patient   Skin integrity remains intact Assess and document risk factors for pressure ulcer development;Assess and document skin integrity;Monitor for areas of redness and/or skin breakdown     Goal: Incisions, wounds, or drain sites healing without S/S of infection  INTERVENTIONS  1. Assess and document risk factors for skin breakdown  2. Assess and document skin integrity  3. Assess and document dressing/incision, wound bed, drain sites and surrounding tissue  4. Implement wound care per orders   Outcome: Progressing   06/30/18 0419   Interventions Appropriate for this Patient   Incision(s), wound(s) or drain site(s) healing without S/S of infection Assess and document risk factors for skin breakdown;Assess and document skin integrity;Assess and document dressing/incision, wound bed, drain sites and surrounding tissue     Goal: Oral mucous membranes remain intact  INTERVENTIONS  1. Assess oral mucosa and hygiene practices  2. Implement preventative oral hygiene regimen  3. Implement oral medicated treatments as ordered   Outcome: Progressing   06/30/18 0419   Interventions Appropriate for this Patient   Oral mucous membranes remain intact Assess oral mucosa and hygiene practices;Implement preventative oral hygiene regimen       Problem: Hematologic - Adult  Goal: Maintains hematologic stability  INTERVENTIONS  1. Assess for signs and symptoms of bleeding or hemorrhage  2. Monitor labs  3. Administer supportive blood products/factors as ordered and appropriate  4. Administer medications as ordered  5. Initiate bleeding precautions as indicated  6. Educate patient/family to report signs/symptoms of bleeding   Outcome: Progressing   06/30/18 0419   Interventions Appropriate for this Patient   Maintains hematologic stability Assess for signs and symptoms of bleeding or hemorrhage;Monitor  labs;Educate patient/family to report signs/symptoms of bleeding       Problem: Pain - Adult  Goal: Verbalizes/displays adequate comfort level or baseline comfort level  INTERVENTIONS:  1. Encourage patient to monitor pain and request interventions  2. Assess pain using the appropriate pain scale  3. Administer analgesics based on type and severity of pain and evaluate response  4. Educate/Implement non-pharmacological measures as appropriate and evaluate response  5. Consider cultural, developmental and social influences on pain and pain management  6. Notify Provider if interventions unsuccessful or patient reports new pain   Outcome: Progressing   06/30/18 0419   Interventions Appropriate for this Patient   Verbalizes/displays adequate comfort level or baseline comfort level Encourage patient to monitor pain and request interventions;Assess pain using the appropriate pain scale;Educate/Implement non-pharmacological measures as appropriate and evaluate response       Problem: Infection - Adult  Goal: Absence of infection during hospitalization  INTERVENTIONS:  1. Assess and monitor for signs and symptoms of infection  2. Monitor lab/diagnostic results  3. Monitor all insertion sites/wounds/incisions  4. Monitor secretions for changes in amount and color  5. Administer medications as ordered  6. Educate and encourage patient and family to use good hand hygiene technique  7. Identify and educate in appropriate isolation precautions for identified infection/condition   Outcome: Progressing   06/30/18 0419   Interventions Appropriate for this Patient   Absence of infection during hospitalization Assess and monitor for signs and symptoms of infection;Monitor lab/diagnostic results;Monitor all insertion sites/wounds/incisions;Monitor secretions for changes in amount and colo;Educate and encourage patient and family to use good hand hygiene technique       Problem: Safety Adult  Goal: Patient will remain safe during  hospitalization  INTERVENTIONS    1. Assess patient for fall risk and implement interventions if needed  2. Use safe transport techniques  3. Assess patient using the Reynaldo skin assessment scale  4. Assess patient for risk of aspiration  5. Assess patient for risk of elopement   Outcome: Progressing   06/30/18 0419   Interventions Appropriate for this Patient   Patient will remain safe durning hospitalization Assess patient for Fall Risk;Use safe transport

## 2018-06-30 NOTE — PLAN OF CARE
Problem: Cardiovascular - Adult  Goal: Maintains optimal cardiac output and hemodynamic stability  INTERVENTIONS:  1. Monitor vital signs and rhythm  2. Monitor for hypotension and other signs of decreased cardiac output  3. Administer and titrate ordered vasoactive medications to optimize hemodynamic stability  4. Monitor for fluid overload/dehydration, weight gain, shortness of breath and activity intolerance  5. Monitor arterial and/or venous puncture sites for bleeding and/or hematoma  6. Assess quality of pulses, capillary refill, edema, sensation, skin color and temperature  7. Assess for signs of decreased coronary artery perfusion - ex. angina   Outcome: Progressing   06/30/18 0419   Interventions Appropriate for this Patient   Maintain optimal cardiac output and hemodynamic stability Monitor vital signs and rhythm;Monitor for hypotension and other signs of decreased cardiac output;Monitor for fluid overload/dehydration, weight gain, shortness of breath and activity intolerance;Monitor arterial and/or venous puncture sites for bleeding and/or hematoma;Assess quality of pulses, capillary refill, edema, sensation, skin color and temperature;Assess for signs of decreased coronary artery perfusion - ex. angina     Goal: Absence of cardiac dysrhythmias or at baseline  INTERVENTIONS:  1. Continuous cardiac monitoring, monitor vital signs, obtain 12 lead EKG if indicated  2. Administer antiarrhythmic and heart rate control medications as ordered  3. Initiate emergency measures for life threatening arrhythmias  4. Monitor electrolytes and administer replacement therapy as ordered   Outcome: Progressing   06/30/18 0419   Interventions Appropriate for this Patient   Absence of cardiac dysrhythmias or at baseline Continuous cardiac monitoring, monitor vital signs, obtain 12 lead EKG if indicated;Monitor electrolytes and administer replacement therapy as ordered       Problem: Metabolic/Fluid and Electrolytes -  Adult  Goal: Electrolytes maintained within normal limits  INTERVENTIONS:  1. Monitor labs and assess patient for signs and symptoms of electrolyte imbalances  2. Administer electrolyte replacement as ordered  3. Monitor response to electrolyte replacements, including repeat lab results as appropriate  4. Fluid restriction as ordered  5. Instruct patient on fluid and nutrition restrictions as appropriate   Outcome: Progressing   06/30/18 0419   Interventions Appropriate for this Patient   Electrolytes maintained within normal limits Monitor labs and assess patient for signs and symptoms of electrolyte imbalances;Instruct patient on fluid and nutrition restrictions as appropriate     Goal: Maintain Optimal Renal Function and Hemodynamic Stability  INTERVENTIONS:  1. Monitor labs and assess for signs and symptoms of volume excess or deficit  2. Monitor intake, output and patient weight  3. Monitor urine specific gravity, serum osmolarity and serum sodium as indicated or ordered  4. Monitor response to interventions for patient's volume status, including labs, urine output, blood pressure (other measures as available)  5. Encourage oral intake as appropriate  6. Instruct patient on fluid and nutrition restrictions as appropriate   Outcome: Progressing   06/30/18 0419   Interventions Appropriate for this Patient   Maintain optimal renal function and hemodynaimc stability Monitor labs and assess for signs and symptoms of volume excess or deficit;Monitor intake, output and patient weight;Monitor response to interventions for patient's volume status, including labs, urine output, blood pressure (other measures as available);Encourage oral intake as appropriate     Goal: Glucose maintained within prescribed range  INTERVENTIONS:  1. Monitor blood glucose as ordered  2. Assess for signs and symptoms of hyperglycemia and hypoglycemia  3. Administer ordered medications to maintain glucose within target range  4. Assess  barriers to adequate nutritional intake and initiate nutrition consult as needed  5. Assess baseline knowledge and provide education as indicated  6. Monitor exercise as may reduce the requirements for insulin   Outcome: Progressing

## 2018-06-30 NOTE — NURSING NOTE
Dr. Mora called with ECHO results and discharge orders. Home instructions,meds, activity and home care reviewed. SL removed with cannula intact. Dressing applied after hemostasis achieved. Has all belongings. Medical record and cath lab film sent with pt. Home meds picked up from [email or web address]. Accompanied to car with one assist via wheelchair.

## 2018-06-30 NOTE — POST-PROCEDURE NOTE
Post Procedure Note:    Winston Galarza  6/29/2018    Pre-op Diagnosis     * ST elevation myocardial infarction (STEMI), unspecified artery (CMS/HCC) [I21.3]       Post-Op Diagnosis Codes:     * ST elevation myocardial infarction (STEMI), unspecified artery (CMS/HCC) [I21.3]    Coronary Angiography  Coronary artery/graft stenting    Anesthesia:  Local    Staff:   Circulator: Bobby Jenkins RN  Documenter: Leonora Gordillo RN; Kyung Magaña RN  Invasive Staff: Sarah Cisneros    Estimated Blood Loss:  No blood loss documented.    Implants:    Implant Name Type Inv. Item Serial No.  Lot No. LRB No. Used   STENT RESOLUTE GABINO 2.75X22 UX ZOTAROLIMUS ELUTING - [id number] Stent STENT RESOLUTE GABINO 2.75X22 UX ZOTAROLIMUS ELUTING  [employer] [id number] N/A 1   STENT RESOLUTE GABINO 2.50X18 UX ZOTAROLIMUS ELUTING - [id number] Stent STENT RESOLUTE GABINO 2.50X18 UX ZOTAROLIMUS ELUTING   [employer] [id number] N/A 1         Medications:  Medications   nitroglycerin (TRIDIL) 50 mg in D5W 250 mL infusion (premix) ( intravenous MAR Unhold 6/29/18 1716)   bivalirudin (ANGIOMAX) 250 mg in NS 50 mL infusion (0 mg/kg/hr × 93 kg  Stopped 6/29/18 1715)       Complications:  None    Date: 6/29/2018  Time: 6:08 PM

## 2018-06-30 NOTE — ED PROVIDER NOTES
CC:  Chief Complaint   Patient presents with   • Chest Pain     started at 1100 while at rest   • Groin Pain     Left side s/p fall down 2 steps at 0830 today   • Shoulder Pain     Left side s/p fall down 2 steps at 0830 today       HPI:  Patient presents the ER by EMS.  He describes it when he got up this morning he felt fine.  He went outside and down some steps.  He describes that there may be a little shallow stepped.  As he got to the bottom he misstepped and had a sat down.  Did not really have a fall per se.  A few hours after that at about 11 AM he started having pain in his left shoulder.  He also had some groin pain.  He was not sure if it went with the shoulder pain or not.  He thinks it might of been from the fall.  He is not sure but the pain in his left chest came on abruptly.  He was at rest when it came on.  Went to the shoulder and left arm.  Says his left wrist is really throbbing.  Maybe a little nausea made a little sweat may be a little lightheaded.    He still smokes but does not drink excessively anymore.  He still overtly making better food choices and has lost weight.  Is unsure of his cholesterol status.  No family history of heart disease.  Not known to be diabetic.  No deliberate exercise at a cardiovascular level.    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  He likes to play music.  He has been relaxing resting while he has been here.  Staying here with friends.            HISTORY:  Active Problem List:  Patient Active Problem List   Diagnosis   • Coronary artery disease involving native coronary artery of native heart with unstable angina pectoris (CMS/[facility])     Medications:  No current facility-administered medications for this encounter.     Current Outpatient Prescriptions:   •  [START ON 7/1/2018] aspirin 81 mg EC tablet, Take 1 tablet (81 mg total) by mouth daily., Disp: 90 tablet, Rfl: 1  •  atorvastatin (LIPITOR) 80 mg tablet, Take 1 tablet (80 mg total) by mouth nightly.,  Disp: 30 tablet, Rfl: 11  •  [START ON 7/1/2018] clopidogrel (PLAVIX) 75 mg tablet, Take 1 tablet (75 mg total) by mouth daily., Disp: 30 tablet, Rfl: 11  •  [START ON 7/1/2018] metoprolol succinate XL (TOPROL-XL) 25 mg 24 hr tablet, Take 1 tablet (25 mg total) by mouth daily., Disp: 30 tablet, Rfl: 1  •  nitroglycerin (NITROSTAT) 0.4 mg SL tablet, Place 1 tablet (0.4 mg total) under the tongue every 5 (five) minutes as needed for chest pain., Disp: 20 tablet, Rfl: 3    Facility-Administered Medications Ordered in Other Encounters:   •  acetaminophen (TYLENOL) tablet 650 mg, 650 mg, oral, q4h PRN, Joni Mora MD  •  aspirin EC tablet 81 mg, 81 mg, oral, Daily, Joni Mora MD, 81 mg at 06/30/18 1141  •  atorvastatin (LIPITOR) tablet 80 mg, 80 mg, oral, Nightly, Joni Mora MD, 80 mg at 06/30/18 1141  •  clopidogrel (PLAVIX) tablet 75 mg, 75 mg, oral, Daily, Joni Mora MD, 75 mg at 06/30/18 1141  •  metoprolol succinate XL (TOPROL-XL) 24 hr tablet 25 mg, 25 mg, oral, Daily, Joni Mora MD, 25 mg at 06/30/18 1141    Allergies:  No Known Allergies  PMH:   History reviewed. No pertinent past medical history.  PSHx:  Past Surgical History:   Procedure Laterality Date   • CORONARY ANGIOGRAPHY N/A 6/29/2018    Procedure: Coronary Angiography;  Surgeon: Steve Kemp MD;  Location: [facility] Cath Lab;  Service: Cardiovascular;  Laterality: N/A;   • CORONARY ARTERY STENTING N/A 6/29/2018    Procedure: Coronary artery/graft stenting;  Surgeon: Steve Kemp MD;  Location: [facility] Cath Lab;  Service: Cardiovascular;  Laterality: N/A;     FH:  No family history on file.  SH:  Social History   Substance Use Topics   • Smoking status: Current Every Day Smoker     Packs/day: 1.00   • Smokeless tobacco: Never Used   • Alcohol use Yes      Comment: occasional     Social History     Social History Narrative   • No narrative on file       ROS:  Review of Systems   Constitutional: Negative for appetite change, chills and fever.        No  Night sweats   HENT: Negative for congestion, postnasal drip, rhinorrhea and sore throat.    Eyes: Negative.    Respiratory: Negative for cough, chest tightness and shortness of breath.    Cardiovascular: Positive for chest pain. Negative for palpitations and leg swelling.   Gastrointestinal: Negative for nausea and vomiting.   Genitourinary: Negative.    Musculoskeletal: Negative for back pain.        Had to sit down not quite fall this morning but does not think he injured his left shoulder or chest in the fall.   Skin: Negative for color change and pallor.   Neurological: Negative for light-headedness.   Psychiatric/Behavioral: Negative for confusion. The patient is not nervous/anxious.        PE:  ED Triage Vitals   Temp Heart Rate Resp BP SpO2   06/29/18 1201 06/29/18 1139 06/29/18 1201 06/29/18 1139 06/29/18 1130   36.8 °C (98.2 °F) 71 22 139/82 96 %      Temp Source Heart Rate Source Patient Position BP Location FiO2 (%)   06/29/18 1344 -- -- -- --   Oral           Physical Exam   Constitutional: He is oriented to person, place, and time. He appears well-developed and well-nourished. No distress.   HENT:   Head: Normocephalic and atraumatic.   Mouth/Throat: Oropharynx is clear and moist.   Eyes: Conjunctivae and EOM are normal. Pupils are equal, round, and reactive to light. No scleral icterus.   Neck: Neck supple.   Cardiovascular: Normal rate, regular rhythm, normal heart sounds and intact distal pulses.  Exam reveals no gallop and no friction rub.    No murmur heard.  Pulmonary/Chest: Effort normal and breath sounds normal. No respiratory distress. He has no wheezes. He has no rales. He exhibits no tenderness.   Abdominal: There is no tenderness.   Musculoskeletal: He exhibits no edema.   Lymphadenopathy:     He has no cervical adenopathy.   Neurological: He is alert and oriented to person, place, and time. No cranial nerve deficit.   Skin: Skin is warm and dry. Capillary refill takes less than 2 seconds.  He is not diaphoretic.   Psychiatric: He has a normal mood and affect. His behavior is normal. Judgment and thought content normal.   Nursing note and vitals reviewed.      ED LABS:  Labs Reviewed   CBC WITH AUTO DIFFERENTIAL - Abnormal        Result Value    WBC 5.5      RBC 4.81      Hemoglobin 15.5      Hematocrit 44.3      MCV 92.0      MCH 32.3      MCHC 35.0      RDW 14.2      Platelets 282      MPV 6.8 (*)     Neutrophils% 52      Lymphocytes% 37      Monocytes% 8      Eosinophils% 2      Basophils% 1     COMPREHENSIVE METABOLIC PANEL - Abnormal     Sodium 137      Potassium 3.6      Chloride 105      CO2 20 (*)     Anion Gap 12 (*)     BUN 16      Creatinine 0.9      Glucose 141 (*)     Calcium 9.1      AST 11      ALT (SGPT) 14      Alkaline Phosphatase 72      Total Protein 5.9 (*)     Albumin 3.6      Total Bilirubin 0.48      eGFR 85      Corrected Calcium 9.4      Narrative:     ESTIMATED GFR CALCULATED USING THE IDMS-TRACEABLE MDRD STUDY EQUATION WITH THE RESULT NORMALIZED TO 1.73M^2 BODY SURFACE AREA.    AVERAGE GFR BY AGE RANGE     20-30 years 116 mL/min/1.73m^2  30-40 years 107 mL/min/1.73m^2  40-50 years 99 mL/min/1.73m^2  50-60 years 93 mL/min/1.73m^2  60-70 years 85 mL/min/1.73m^2  70-up years 75 mL/min/1.73m^2   LIPID PANEL - Abnormal     Triglycerides 177 (*)     Cholesterol 241 (*)     HDL 37 (*)     LDL Calculated 169 (*)    TROPONIN I - Normal    Troponin I <0.030     PROTIME-INR - Normal    Protime 13.1      INR 0.98     MAGNESIUM - Normal    Magnesium 1.9     PTT (ACTIVATED PARTIAL THROMBOPLASTIN TIME) - Normal    PTT 31     B-TYPE NATRIURETIC PEPTIDE (BNP) - Normal    BNP 36      Narrative:     B-TYPE NATRIURETIC PEPTIDE INTERPRETIVE DATA: A cutoff of 100 pg/mL has been demonstrated to provide the maximal combination of sensitivity, specificity, and negative predictive value for contributing to the diagnosis of congestive heart failure (CHF).  A BNP value greater than or equal to 100  pg/mL is consistent with a diagnosis of CHF in the appropriate clinical setting.   D-DIMER, QUANTITATIVE - Normal    D-Dimer, Quant (FEU) 0.37           ED IMAGES:  CT angiogram chest PE with IV contrast   Final Result   IMPRESSION:      1.  No pulmonary embolus.   2.  Otherwise normal.             ED PROCEDURES:  Procedures  EKG: Initial EKG showed significant elevation in the lateral leads with upsloping.  Did not look classic for STEMI.  I reviewed this with Dr. Mora.  He was thinking more of a pericarditis type of situation.  We discussed that the pain came on very abruptly.  It was sometime into the ER visit that I received a call back from Dr. Mora.  By then I was concerned about possible aortic dissection.  Had patient go to CTA.  It was to been a chest abdomen pelvis but there was a communication error.  Just the CT of the chest was done.    When he returned the pain was worse and EKG showed increased ST elevation in the lateral leads.  An additional and last EKG was done showing more significant ST elevation and we proceeded with TNKase.  Subsequent EKG showed improvement in the ST elevation.  Mainly through V4 through V6 and lead I.    ED MEDS:  Medications   ondansetron (ZOFRAN) injection 4 mg (4 mg intravenous Given 6/29/18 1154)   fentaNYL citrate (PF) 50 mcg/mL injection 25 mcg (25 mcg intravenous Given 6/29/18 1205)   iopamidol (ISOVUE-370) 76 % injection 100 mL (100 mL intravenous Given 6/29/18 1320)       ED COURSE:   When patient first presented he appeared that he could be having a heart attack.  He received aspirin and we gave nitro x2.  He decreased his pressure.  We gave a fluid bolus.  That improved.  He received additional fentanyl.  He had received fentanyl 100 mcg by EMS.  We gave an additional 50.  Pain improved.  It was unclear if it was in response to fentanyl or the nitro but it seemed to have initially been the nitro.    As mentioned above when the patient returned from the CT scan it  was apparent his pain was worsening again.  We gave a third nitro.  This did relieve the pain slightly but not significantly.  This is when additional EKG showed worsening ST elevation.  It was at this point that patient became more critical.  That was approximately 1250.  EKG at that time showed increasing ST elevation.  Attempted visiting with Dr. Mora through on call.  He was tied up in the Cath Lab.  With the patient having severe pain not relieved by third nitro substantially decided to proceed with TNKase.  Dr. Mora called back.  We reviewed the EKGs that I have sent in in the middle of that conversation I obtained an additional EKG that showed the even worsening ST elevation.  We digitally sent that image to Dr. Mora.  He agreed TNKase was appropriate.  That was administered along with Plavix 600.  He had already received Lovenox.    Patient had improvement ST changes and his chest pain shortly after receiving the TNKase.  ASSESSMENT/PLAN:  1. ST elevation myocardial infarction (STEMI), unspecified artery (CMS/HCC)          DISCUSSION/MDM:  Initially not unclear presentation of chest pain.  Look to be coronary in nature but was not clear.  The initial evaluation was comprehensive in thinking through possibility of PE and aortic dissection as well as coronary occlusion.  Then at 1250 I would say we started critical care.  That lasted from 1250 until patient discharged with EMS at 1352 to travel to [place] by ground ambulance to [facility] ED and Cath Lab with Dr. Mora.  62 minutes of critical care time addressing the STEMI, in addition to the initial chest pain workup that was not obvioulsy STEMI on presentation.    Subsequently Dr. Mora did convey back to us that the patient had a diagonal artery occlusion as well as a right circumflex occlusion but the diagonal was the culprit lesion.  He was stented in both those arteries and was doing well.    DC MED LIST:    Current Outpatient Prescriptions:    •  [START ON 7/1/2018] aspirin 81 mg EC tablet, Take 1 tablet (81 mg total) by mouth daily., Disp: 90 tablet, Rfl: 1  •  atorvastatin (LIPITOR) 80 mg tablet, Take 1 tablet (80 mg total) by mouth nightly., Disp: 30 tablet, Rfl: 11  •  [START ON 7/1/2018] clopidogrel (PLAVIX) 75 mg tablet, Take 1 tablet (75 mg total) by mouth daily., Disp: 30 tablet, Rfl: 11  •  [START ON 7/1/2018] metoprolol succinate XL (TOPROL-XL) 25 mg 24 hr tablet, Take 1 tablet (25 mg total) by mouth daily., Disp: 30 tablet, Rfl: 1  •  nitroglycerin (NITROSTAT) 0.4 mg SL tablet, Place 1 tablet (0.4 mg total) under the tongue every 5 (five) minutes as needed for chest pain., Disp: 20 tablet, Rfl: 3    INSTRUCTIONS:  See the patient instructions in the Encounter and After Visit Summary.  An After Visit Summary was printed and given to the patient.    FOLLOW UP:  No follow-up provider specified.    Signed electronically by me:  Mary Torres MD  6/30/2018  5:27 PM         Mary Torres MD  06/30/18 0728       Mary Torres MD  07/09/18 [id number]

## 2018-06-30 NOTE — DISCHARGE SUMMARY
Cardiology Discharge Summary      Admitting Provider: Joni Mora MD  Discharge Provider: Joni Mora MD    Admission Date: 6/29/2018  Discharge date: 6/30/2018    Primary Discharge Diagnosis  1.  Acute lateral ST segment elevation myocardial infarction status post 2.5 x 18 mm Resolute Enoc stent placement in the 1st Diagonal.  90% proximal RCA stenosis was also noted status post 2.75 x 22 mm Resolute Enoc stent placement    Secondary Discharge Diagnosis  1.  Ongoing tobacco use  2.  Dyslipidemia with total cholesterol 241, , HDL 37, triglycerides 177     Discharge medications:  1.  Aspirin 81 mg daily  2.  Plavix 75 mg daily.  This will need to be continued for a minimum of 12 months.  3.  Atorvastatin 80 mg p.o. nightly.  The patient will need a follow-up lipid profile and liver function tests in 3 months.  4.  Toprol-XL 25 mg daily  5.  Sublingual nitroglycerin 0.4 mg as needed for chest pain    Discharge Condition: Good    Code Status at Discharge: Full code    Outpatient Follow-Up  1.  Follow-up with primary care provider in [place], [place] within 1-2 weeks  2.  Follow-up with cardiology provider in [place], [place] within 1 month.  Please see the list below.      DETAILS OF HOSPITAL STAY    Presenting Problem/History of Present Illness  The patient is a 62-year-old male from [facility] who is visiting friends in [place], [place].  On the morning of 6/29/2018, the patient developed fairly severe left axillary pain with radiation into the left arm, left chest, and interestingly, left groin region.  He went to the emergency department in [place] and over the course of about 1-1/2 hours demonstrate and an evolving lateral ST segment elevation myocardial infarction.  TNK was administered intravenously, as well as a 300 mg clopidogrel loading dose and subcutaneous Lovenox.  The patient was transferred to [place] for further evaluation.  Upon arrival, he was noted to have mild ongoing ST segment  elevation in the lateral leads.  His pain was much improved.  He was taken directly to the cardiac catheterization laboratory.    Hospital Course  Coronary angiography demonstrates 50-60% proximal LAD disease.  There is a focal thrombotic appearing lesion in a mid diagonal branch which likely represents the culprit lesion.  The patient was noted to have mild disease in the left circumflex.  A focal 90% proximal RCA lesion was also noted.  Patient underwent successful percutaneous coronary intervention with drug-eluting stents as described above.  He tolerated the procedure well and had complete resolution of his symptoms.  12-lead electrocardiogram done post procedure demonstrates resolution of the ST segment elevation.  He was monitored in the surgical intensive care unit overnight.  The following morning, the patient was doing very well.  Rhythm was stable with no ventricular arrhythmias.  Hemodynamics were also stable.  He was started on beta-blocker therapy, high intensity statin therapy, and continued on dual antiplatelet therapy with aspirin and Plavix.  He tolerated all these without difficulty.  Metabolic panel, CBC remained within normal limits.  Peak troponin was 8.34.  Echocardiogram is pending at the time of this dictation.  He ambulated with cardiac rehab without difficulty.  He was therefore discharged to home in stable condition.    Operative Procedures Performed  Rescue percutaneous coronary intervention with a 2.5 x 18 mm Resolute [place] stent to the mid first diagonal and a 2.75 x 22 mm RESOLUTE GABINO stent to the proximal right coronary artery    Physical Exam at Discharge  GENERAL: Awake and alert in no acute distress  LUNGS: Clear to auscultation bilaterally, no wheezes, rales or rhonchi.  HEART: Regular rate and rhythm without murmurs, rubs or gallops.  ABDOMEN: Normoactive bowel sounds, soft, nontender, no masses, no rebound, no guarding.  NEUROLOGICAL: Normal motor function, normal  sensation.    Time spent coordinating discharge: 30 minutes

## 2018-06-30 NOTE — ED NOTES
Last oral intake - breakfast at 10am pt NPO since arrival in ED, NPO for transfer     Nakul Flores RN  06/29/18 6095

## 2018-06-30 NOTE — NURSING END OF SHIFT
Nursing End of Shift Summary    Goals:  Clinical Goals for the Shift: Monitor Right radial site for bleeding after TR Band removal, pain control, monitor VS, maintain safety and comfort     Narrative Summary of Progress Towards Clinical Goals:  Goal met: TR band removed with no complication. Right radial site dry and intact. No complaint of pain voiced. VS stable. Safety and comfort maintained.     Barriers for Transfer or Discharge: no

## 2018-06-30 NOTE — PLAN OF CARE
Problem: Cardiovascular - Adult  Goal: Maintains optimal cardiac output and hemodynamic stability  INTERVENTIONS:  1. Monitor vital signs and rhythm  2. Monitor for hypotension and other signs of decreased cardiac output  3. Administer and titrate ordered vasoactive medications to optimize hemodynamic stability  4. Monitor for fluid overload/dehydration, weight gain, shortness of breath and activity intolerance  5. Monitor arterial and/or venous puncture sites for bleeding and/or hematoma  6. Assess quality of pulses, capillary refill, edema, sensation, skin color and temperature  7. Assess for signs of decreased coronary artery perfusion - ex. angina  Outcome: Progressing   06/29/18 1901   Interventions Appropriate for this Patient   Maintain optimal cardiac output and hemodynamic stability Monitor vital signs and rhythm;Monitor for hypotension and other signs of decreased cardiac output;Administer and titrate ordered vasoactive medications to optimize hemodynamic stability;Monitor for fluid overload/dehydration, weight gain, shortness of breath and activity intolerance;Assess quality of pulses, capillary refill, edema, sensation, skin color and temperature;Monitor arterial and/or venous puncture sites for bleeding and/or hematoma;Assess for signs of decreased coronary artery perfusion - ex. angina     Goal: Absence of cardiac dysrhythmias or at baseline  INTERVENTIONS:  1. Continuous cardiac monitoring, monitor vital signs, obtain 12 lead EKG if indicated  2. Administer antiarrhythmic and heart rate control medications as ordered  3. Initiate emergency measures for life threatening arrhythmias  4. Monitor electrolytes and administer replacement therapy as ordered  Outcome: Progressing   06/29/18 1901   Interventions Appropriate for this Patient   Absence of cardiac dysrhythmias or at baseline Continuous cardiac monitoring, monitor vital signs, obtain 12 lead EKG if indicated;Administer antiarrhythmic and heart  rate control medications as ordered;Initiate emergency measures for life threatening arrhythmias;Monitor electrolytes and administer replacement therapy as ordered       Problem: Metabolic/Fluid and Electrolytes - Adult  Goal: Electrolytes maintained within normal limits  INTERVENTIONS:  1. Monitor labs and assess patient for signs and symptoms of electrolyte imbalances  2. Administer electrolyte replacement as ordered  3. Monitor response to electrolyte replacements, including repeat lab results as appropriate  4. Fluid restriction as ordered  5. Instruct patient on fluid and nutrition restrictions as appropriate  Outcome: Progressing   06/29/18 1901   Interventions Appropriate for this Patient   Electrolytes maintained within normal limits Monitor labs and assess patient for signs and symptoms of electrolyte imbalances;Administer electrolyte replacement as ordered;Monitor response to electrolyte replacements, including repeat lab results as appropriate;Fluid restriction as ordered;Instruct patient on fluid and nutrition restrictions as appropriate       Problem: Skin/Tissue Integrity - Adult  Goal: Incisions, wounds, or drain sites healing without S/S of infection  INTERVENTIONS  1. Assess and document risk factors for skin breakdown  2. Assess and document skin integrity  3. Assess and document dressing/incision, wound bed, drain sites and surrounding tissue  4. Implement wound care per orders  Outcome: Progressing   06/29/18 1901   Interventions Appropriate for this Patient   Incision(s), wound(s) or drain site(s) healing without S/S of infection Assess and document risk factors for skin breakdown;Assess and document skin integrity;Assess and document dressing/incision, wound bed, drain sites and surrounding tissue;Implement wound care per orders       Problem: Hematologic - Adult  Goal: Maintains hematologic stability  INTERVENTIONS  1. Assess for signs and symptoms of bleeding or hemorrhage  2. Monitor  labs  3. Administer supportive blood products/factors as ordered and appropriate  4. Administer medications as ordered  5. Initiate bleeding precautions as indicated  6. Educate patient/family to report signs/symptoms of bleeding  Outcome: Progressing   06/29/18 1901   Interventions Appropriate for this Patient   Maintains hematologic stability Assess for signs and symptoms of bleeding or hemorrhage;Monitor labs;Administer supportive blood products/factors as ordered and appropriate;[place] medications as ordered;Educate patient/family to report signs/symptoms of bleeding;Initiate bleeding precautions as indicated       Problem: Pain - Adult  Goal: Verbalizes/displays adequate comfort level or baseline comfort level  INTERVENTIONS:  1. Encourage patient to monitor pain and request interventions  2. Assess pain using the appropriate pain scale  3. Administer analgesics based on type and severity of pain and evaluate response  4. Educate/Implement non-pharmacological measures as appropriate and evaluate response  5. Consider cultural, developmental and social influences on pain and pain management  6. Notify Provider if interventions unsuccessful or patient reports new pain  Outcome: Progressing   06/29/18 1901   Interventions Appropriate for this Patient   Verbalizes/displays adequate comfort level or baseline comfort level Encourage patient to monitor pain and request interventions;Assess pain using the appropriate pain scale;Administer analgesics based on type and severity of pain and evaluate response;Educate/Implement non-pharmacological measures as appropriate and evaluate response;Consider cultural, developmental and social influences on pain and pain management;Notify Provider if interventions unsuccessful or patient reports new pain       Problem: Knowledge Deficit  Goal: Patient/family/caregiver demonstrates understanding of disease process, treatment plan, medications, and discharge  instructions  INTERVENTIONS:   1. Complete learning assessment and assess knowledge base  2. Provide teaching at level of understanding   3. Provide teaching via preferred learning methods  Outcome: Progressing   06/29/18 1901   Interventions Appropriate for this Patient   Patient/family/caregiver demonstrates understanding of disease process, treatment plan, medications, and discharge instructions Complete learning assessment and assess knowledge base;Provide teaching via preferred learning methods;Provide teaching at level of understanding

## 2018-07-02 LAB
AV LVOT PEAK GRADIENT: 4 MMHG
AV MEAN GRADIENT: 3 MMHG
AV PEAK GRADIENT: 4 MMHG
BSA FOR ECHO PROCEDURE: 2.19 M2
DOP CALC AO PEAK VEL: 1 M/S
DOP CALC AO VTI: 21.9 CM
DOP CALC LVOT DIAMETER: 2.5 CM
DOP CALC LVOT STROKE VOLUME: 103 CM3
DOP CALC MV VTI: 24.2 CM
DOP CALCLVOT PEAK VEL VTI: 21 CM
E/A RATIO: 1.1
E/E' RATIO (AVERAGE): 8
E/E' RATIO: 9
ECHO EF ESTIMATED: 52 %
EJECTION FRACTION: 52 %
INTERVENTRICULAR SEPTUM: 1 CM (ref 0.6–1.1)
LA AREA A4C SYSTOLE: 55.4 CM3
LA AREA A4C SYSTOLE: 55.4 CM3
LEFT ATRIUM VOLUME INDEX: 27 ML/M2
LEFT ATRIUM VOLUME: 57.7 CM3
LEFT INTERNAL DIMENSION IN SYSTOLE: 3.1 CM (ref 2.1–4)
LEFT VENTRICLE DIASTOLIC VOLUME: 114 CM3
LEFT VENTRICLE SYSTOLIC VOLUME: 55 CM3
LEFT VENTRICULAR INTERNAL DIMENSION IN DIASTOLE: 4.8 CM (ref 3.5–6)
LVAD-AP2: 34.5 CM2
MR VTI: 165 CM
MV MEAN GRADIENT: 1.2 MMHG
MV PEAK A VEL: 55.7 CM/S
MV PEAK E VEL: 61.8 CM/S
MV PEAK GRADIENT: 2 MMHG
MV STENOSIS PRESSURE HALF TIME: 86 MS
MV VALVE AREA P 1/2 METHOD: 2.56 CM2
MV VMAX: 74.1 CM/S
MVA (VTI): 4.26 CM2
PISA MRMAX VEL: 470 CM/S
POSTERIOR WALL: 1.1 CM (ref 0.6–1.1)
PULM VEIN S/D RATIO: 1.5
PV MEAN GRADIENT: 1 MMHG
PV PEAK D VEL: 33.1 CM/S
PV PEAK S VEL: 49.9 CM/S
PV VMAX: 0.7 CM/S
PVA VTI: 19.8 CM
RA AREA: 13.2 CM2
RH CV ECHO AV VALVE AREA VEL: 4.6 CM2
RH CV ECHO AV VALVE AREA VTI: 4.7 CM2
RH LVOT PEAK VELOCITY REST: 1 M/S
RV AP4 BASE: 3 CM
RV AP4 LONGITUDINAL: 6.5 CM
RV AP4 MID: 2.5 CM
S': 10.4 CM/S
TDI: 6.9 CM/S
TDILATERAL: 9 CM/S
TRICUSPID ANNULAR PLANE SYSTOLIC EXCURSION: 1.9 CM
TV PEAK E VEL: 0.4 M/S
Z-SCORE OF LEFT VENTRICULAR DIMENSION IN END DIASTOLE: -4.09
Z-SCORE OF LEFT VENTRICULAR DIMENSION IN END SYSTOLE: -2.77

## 2019-05-25 DIAGNOSIS — I21.09 ST ELEVATION MYOCARDIAL INFARCTION (STEMI) INVOLVING OTHER CORONARY ARTERY OF ANTERIOR WALL (CMS/HCC): ICD-10-CM

## 2019-05-28 RX ORDER — ATORVASTATIN CALCIUM 80 MG/1
TABLET, FILM COATED ORAL
Qty: 30 TABLET | Refills: 9 | OUTPATIENT
Start: 2019-05-28

## 2019-07-24 DIAGNOSIS — I21.09 ST ELEVATION MYOCARDIAL INFARCTION (STEMI) INVOLVING OTHER CORONARY ARTERY OF ANTERIOR WALL (CMS/HCC): ICD-10-CM

## 2019-07-25 ENCOUNTER — TELEPHONE (OUTPATIENT)
Dept: CARDIOLOGY | Facility: CLINIC | Age: 64
End: 2019-07-25

## 2019-07-25 DIAGNOSIS — I21.09 ST ELEVATION MYOCARDIAL INFARCTION (STEMI) INVOLVING OTHER CORONARY ARTERY OF ANTERIOR WALL (CMS/HCC): ICD-10-CM

## 2019-07-25 RX ORDER — ATORVASTATIN CALCIUM 80 MG/1
80 TABLET, FILM COATED ORAL NIGHTLY
Qty: 30 TABLET | Refills: 0 | Status: SHIPPED | OUTPATIENT
Start: 2019-07-25 | End: 2019-08-24

## 2019-07-25 NOTE — TELEPHONE ENCOUNTER
Voice msg to return call r/t needing updated lipid panel. Atorvastatin 80 mg refilled 30 tablets 0 refills.

## 2019-07-29 RX ORDER — ATORVASTATIN CALCIUM 80 MG/1
TABLET, FILM COATED ORAL
Qty: 90 TABLET | Refills: 3 | OUTPATIENT
Start: 2019-07-29

## 2019-08-05 DIAGNOSIS — I21.09 ST ELEVATION MYOCARDIAL INFARCTION (STEMI) INVOLVING OTHER CORONARY ARTERY OF ANTERIOR WALL (CMS/HCC): ICD-10-CM

## 2019-08-13 ENCOUNTER — TELEPHONE (OUTPATIENT)
Dept: CARDIOLOGY | Facility: CLINIC | Age: 64
End: 2019-08-13

## 2019-08-25 DIAGNOSIS — I21.09 ST ELEVATION MYOCARDIAL INFARCTION (STEMI) INVOLVING OTHER CORONARY ARTERY OF ANTERIOR WALL (CMS/HCC): ICD-10-CM

## 2019-08-26 DIAGNOSIS — I21.09 ST ELEVATION MYOCARDIAL INFARCTION (STEMI) INVOLVING OTHER CORONARY ARTERY OF ANTERIOR WALL (CMS/HCC): ICD-10-CM

## 2019-08-26 RX ORDER — ASPIRIN 81 MG/1
TABLET ORAL
Qty: 90 TABLET | Refills: 3 | Status: SHIPPED | OUTPATIENT
Start: 2019-08-26

## 2019-08-29 ENCOUNTER — TELEPHONE (OUTPATIENT)
Dept: CARDIOLOGY | Facility: CLINIC | Age: 64
End: 2019-08-29

## 2019-08-30 RX ORDER — CLOPIDOGREL BISULFATE 75 MG/1
TABLET ORAL
Qty: 90 TABLET | Refills: 3 | OUTPATIENT
Start: 2019-08-30

## 2019-08-30 RX ORDER — ATORVASTATIN CALCIUM 80 MG/1
80 TABLET, FILM COATED ORAL NIGHTLY
OUTPATIENT
Start: 2019-08-30 | End: 2019-09-29

## 2019-08-30 RX ORDER — METOPROLOL SUCCINATE 25 MG/1
TABLET, EXTENDED RELEASE ORAL
Qty: 90 TABLET | Refills: 3 | OUTPATIENT
Start: 2019-08-30

## 2024-10-17 ENCOUNTER — OFFICE VISIT (OUTPATIENT)
Dept: URBAN - METROPOLITAN AREA CLINIC 26 | Facility: CLINIC | Age: 69
End: 2024-10-17
Payer: MEDICARE

## 2024-10-17 DIAGNOSIS — H25.13 AGE-RELATED NUCLEAR CATARACT, BILATERAL: ICD-10-CM

## 2024-10-17 DIAGNOSIS — H04.123 DRY EYE SYNDROME OF BILATERAL LACRIMAL GLANDS: Primary | ICD-10-CM

## 2024-10-17 PROCEDURE — 92004 COMPRE OPH EXAM NEW PT 1/>: CPT | Performed by: OPTOMETRIST

## 2024-10-17 RX ORDER — FLUOROMETHOLONE 1 MG/ML
0.1 % SOLUTION/ DROPS OPHTHALMIC
Qty: 5 | Refills: 1 | Status: ACTIVE
Start: 2024-10-17

## 2024-10-17 ASSESSMENT — VISUAL ACUITY
OD: 20/20
OS: 20/20

## 2024-10-17 ASSESSMENT — KERATOMETRY
OS: 41.63
OD: 41.13

## 2024-10-17 ASSESSMENT — INTRAOCULAR PRESSURE
OD: 9
OS: 10

## (undated) DEVICE — CATH GUIDE 6F 1.50 IKARI RT

## (undated) DEVICE — GUIDE WIRE J TIP .038 145CM

## (undated) DEVICE — CATH GUIDE 6F AR2 RUNWAY

## (undated) DEVICE — GUIDE WIRE WHOLEY .035INX260CM INTERMEDIATE MODIFIED J/SHAPEABLE 0005281

## (undated) DEVICE — NEEDLE RADIAL 21G 2.5CM

## (undated) DEVICE — CATH GUIDE 6F CLS3.5 RUNWAY

## (undated) DEVICE — KIT SHEATH GLIDE 6/5F 16CM

## (undated) DEVICE — CATH GUIDE 5F EBU 3.5 LAUNCHER

## (undated) DEVICE — CATH DXTERITY 5F JR40 100CM

## (undated) DEVICE — WIRE FORTE 185CM X .014 MODERATE SUPPORT

## (undated) DEVICE — BALLOON SPRINTER RX 2.5 X 12 LEGEND

## (undated) DEVICE — BAND TR STD W INFLATOR

## (undated) DEVICE — CATH DXTERITY 5F JL3.5 100CM